# Patient Record
Sex: FEMALE | Race: ASIAN | NOT HISPANIC OR LATINO | Employment: PART TIME | URBAN - METROPOLITAN AREA
[De-identification: names, ages, dates, MRNs, and addresses within clinical notes are randomized per-mention and may not be internally consistent; named-entity substitution may affect disease eponyms.]

---

## 2021-11-15 ENCOUNTER — MEDICAL CORRESPONDENCE (OUTPATIENT)
Dept: HEALTH INFORMATION MANAGEMENT | Facility: CLINIC | Age: 21
End: 2021-11-15
Payer: COMMERCIAL

## 2021-11-19 ENCOUNTER — TRANSCRIBE ORDERS (OUTPATIENT)
Dept: OTHER | Age: 21
End: 2021-11-19
Payer: COMMERCIAL

## 2021-11-19 DIAGNOSIS — M79.642 PAIN OF LEFT HAND: Primary | ICD-10-CM

## 2021-11-19 DIAGNOSIS — G56.02 CARPAL TUNNEL SYNDROME OF LEFT WRIST: ICD-10-CM

## 2022-02-19 ENCOUNTER — HOSPITAL ENCOUNTER (EMERGENCY)
Facility: HOSPITAL | Age: 22
Discharge: HOME | End: 2022-02-19
Attending: EMERGENCY MEDICINE | Admitting: EMERGENCY MEDICINE
Payer: COMMERCIAL

## 2022-02-19 VITALS
DIASTOLIC BLOOD PRESSURE: 66 MMHG | HEART RATE: 87 BPM | RESPIRATION RATE: 12 BRPM | HEIGHT: 60 IN | OXYGEN SATURATION: 99 % | WEIGHT: 180 LBS | SYSTOLIC BLOOD PRESSURE: 146 MMHG | TEMPERATURE: 98.2 F | BODY MASS INDEX: 35.34 KG/M2

## 2022-02-19 DIAGNOSIS — G51.0 LEFT-SIDED BELL'S PALSY: ICD-10-CM

## 2022-02-19 PROCEDURE — 99284 EMERGENCY DEPT VISIT MOD MDM: CPT

## 2022-02-19 RX ORDER — VALACYCLOVIR HYDROCHLORIDE 1 G/1
1000 TABLET, FILM COATED ORAL 3 TIMES DAILY
Qty: 21 TABLET | Refills: 0 | Status: SHIPPED | OUTPATIENT
Start: 2022-02-19 | End: 2022-02-26

## 2022-02-19 RX ORDER — PREDNISONE 20 MG/1
TABLET ORAL
Qty: 21 TABLET | Refills: 0 | Status: SHIPPED | OUTPATIENT
Start: 2022-02-19

## 2022-02-19 RX ORDER — CARBOXYMETHYLCELLULOSE SODIUM 5 MG/ML
1 SOLUTION/ DROPS OPHTHALMIC
Qty: 30 ML | Refills: 0 | Status: SHIPPED | OUTPATIENT
Start: 2022-02-19

## 2022-02-19 ASSESSMENT — ENCOUNTER SYMPTOMS
VOMITING: 0
NECK PAIN: 0
NUMBNESS: 0
CONFUSION: 0
CHILLS: 0
SHORTNESS OF BREATH: 0
FACIAL ASYMMETRY: 1
COUGH: 0
DYSURIA: 0
WEAKNESS: 0
FATIGUE: 0
ABDOMINAL PAIN: 0
HEADACHES: 0
FEVER: 0

## 2022-02-19 NOTE — ED PROVIDER NOTES
EMERGENCY DEPARTMENT ENCOUNTER      NAME: Lori Carlson  AGE: 21 year old female  YOB: 2000  MRN: [id number]  EVALUATION DATE & TIME: 2/19/2022  1:41 PM    PCP: No primary care provider on file.    ED PROVIDER: Emerita Nur DO      Chief Complaint   Patient presents with     Facial Droop         FINAL IMPRESSION:  1. Left-sided Bell's palsy          ED COURSE & MEDICAL DECISION MAKING:    Pertinent Labs & Imaging studies reviewed. (See chart for details)  2:05 PM I met the patient and performed my initial interview and exam.    21 year old female presents to the Emergency Department for evaluation of left sided facial droop.    Patient presenting for evaluation of left-sided facial droop. She also difficulty eating on that side. Preceded with a viral syndrome with cough, runny nose and cold sore above her lip. Unable to fully shut her left eye. She wears glasses. No other focal deficits. Patient does have left upper and lower facial weakness consistent with Bell's palsy. Her TMs are clear. She does have herpetic lesion above her upper lip, but however does not involve the nose. No neck rigidity. I did discuss diagnosis and symptomatic care with patient. She will be placed on prednisone, valacyclovir and refresh eyedrops. Encourage close follow-up with her primary monico and return if any lateralizing symptoms.    At the conclusion of the encounter I discussed the results of all of the tests and the disposition. The questions were answered. The patient or family acknowledged understanding and was agreeable with the care plan.       MEDICATIONS GIVEN IN THE EMERGENCY:  Medications - No data to display    NEW PRESCRIPTIONS STARTED AT TODAY'S ER VISIT  New Prescriptions    ARTIFICIAL TEARS OINT OPHTHALMIC OINTMENT    Place 1 g Into the left eye At Bedtime    CARBOXYMETHYLCELLULOSE (REFRESH PLUS) 0.5 % SOLN OPHTHALMIC SOLUTION    Place 1 drop Into the left eye every hour as needed for dry eyes     "PREDNISONE (DELTASONE) 20 MG TABLET    Take three tablets (= 60mg) each day for 7 (seven) days    VALACYCLOVIR (VALTREX) 1000 MG TABLET    Take 1 tablet (1,000 mg) by mouth 3 times daily for 7 days          =================================================================    HPI    Patient information was obtained from: Patient    Use of : N/A        Lori Carlson is a 21 year old female with no pertinent history who presents to this ED via private car for evaluation of left sided facial droop.     Patient reports left sided facial droop and swelling with inability to completely close her left eye and raise her left eyebrow since Tuesday(2/15/22). She notes not being able to eat with left side of her mouth as she has lost almost all motor control of the left of her face. This has never happened before. Patient reports getting sick \"the week before Tuesday\" and getting better before her facial droop onset. She remarks left eye watering because she cannot close her eye lid all the way. Patient developed a cold sore to her upper lip at this time as well. No changes in hearing or vision. No recent travel. No allergies to medications. Denies fever, confusion, numbness in extremities, falls, neck pain, or any other symptoms at this time.       REVIEW OF SYSTEMS   Review of Systems   Constitutional: Negative for chills, fatigue and fever.   Eyes: Negative for visual disturbance.   Respiratory: Negative for cough and shortness of breath.    Cardiovascular: Negative for chest pain.   Gastrointestinal: Negative for abdominal pain and vomiting.   Genitourinary: Negative for dysuria.   Musculoskeletal: Negative for neck pain.   Skin: Negative.         Pos for cold sore to upper lip   Neurological: Positive for facial asymmetry (left sided facial droop(4 days)). Negative for syncope, weakness, numbness and headaches.   Psychiatric/Behavioral: Negative for confusion.   All other systems reviewed and are negative.   "     PAST MEDICAL HISTORY:  No past medical history on file.    PAST SURGICAL HISTORY:  No past surgical history on file.        CURRENT MEDICATIONS:    artificial tears OINT ophthalmic ointment  carboxymethylcellulose (REFRESH PLUS) 0.5 % SOLN ophthalmic solution  predniSONE (DELTASONE) 20 MG tablet  valACYclovir (VALTREX) 1000 mg tablet         ALLERGIES:  No Known Allergies    FAMILY HISTORY:  No family history on file.    SOCIAL HISTORY:   Social History     Socioeconomic History     Marital status: Not on file     Spouse name: Not on file     Number of children: Not on file     Years of education: Not on file     Highest education level: Not on file   Occupational History     Not on file   Tobacco Use     Smoking status: Not on file     Smokeless tobacco: Not on file   Substance and Sexual Activity     Alcohol use: Not on file     Drug use: Not on file     Sexual activity: Not on file   Other Topics Concern     Not on file   Social History Narrative     Not on file     Social Determinants of Health     Financial Resource Strain: Not on file   Food Insecurity: Not on file   Transportation Needs: Not on file   Physical Activity: Not on file   Stress: Not on file   Social Connections: Not on file   Intimate Partner Violence: Not on file   Housing Stability: Not on file       VITALS:  BP (!) 165/99   Pulse 96   Temp 98.2  F (36.8  C) (Tympanic)   Resp 12   Ht 1.524 m (5')   Wt 81.6 kg (180 lb)   SpO2 98%   BMI 35.15 kg/m      PHYSICAL EXAM    Physical Exam  Constitutional:       General: She is not in acute distress.  HENT:      Head: Normocephalic and atraumatic.      Right Ear: Tympanic membrane normal.      Left Ear: Tympanic membrane normal.      Nose: Nose normal.      Mouth/Throat:      Lips: Pink.      Mouth: Mucous membranes are moist. No oral lesions.      Pharynx: Oropharynx is clear.   Eyes:      General: No visual field deficit.     Pupils: Pupils are equal, round, and reactive to light.    Cardiovascular:      Rate and Rhythm: Normal rate and regular rhythm.      Pulses: Normal pulses.      Heart sounds: Normal heart sounds.   Pulmonary:      Effort: Pulmonary effort is normal.      Breath sounds: Normal breath sounds.   Abdominal:      General: Abdomen is flat. Bowel sounds are normal.      Palpations: Abdomen is soft.      Tenderness: There is no abdominal tenderness.   Musculoskeletal:         General: Normal range of motion.      Cervical back: No rigidity.   Skin:     General: Skin is warm and dry.      Capillary Refill: Capillary refill takes less than 2 seconds.      Findings: Lesion (herpetic lesions above upper lip) present.   Neurological:      General: No focal deficit present.      Mental Status: She is alert and oriented to person, place, and time.      Cranial Nerves: Facial asymmetry (Left sided upper and lower facial weakness) present. No dysarthria.      Sensory: Sensation is intact.      Motor: Motor function is intact. Weakness: left upper and lower face.      Coordination: Coordination is intact.      Gait: Gait is intact.      Comments: Inability to completely close left eye or raise left eyebrow       [place]ale, am serving as a scribe to document services personally performed by Dr. Emerita Nur based on my observation and the provider's statements to me. Emerita RUIZ DO attest that Dale Farr is acting in a scribe capacity, has observed my performance of the services and has documented them in accordance with my direction.    Emerita Nur DO  Emergency Medicine  [facility] EMERGENCY DEPARTMENT  [facility] [facility] [address]  [phone]  Dept: [phone]       Emerita Nur DO  02/19/22 1424

## 2022-02-19 NOTE — DISCHARGE INSTRUCTIONS
Start prednisone 60 mg daily for 1 week  Start valacyclovir, 1 tablet 3 times a day for 1 week  Lubricating eye solution to left eye every hour as needed during the day  Placed ointment to eye at bedtime, may cover with a eye shield  Follow closely with your doctor  Return if any high fevers, confusion, other neurologic symptoms or any other concerns

## 2022-02-19 NOTE — ED TRIAGE NOTES
Pt arrives with a 4 day hx (started on  Tuesday) of left facial  Droop and inability to close left eye. Her left eyed is watering as well. Pt did have viral sx 1 week before that with a cough,runny nose, and a cold sore under her nose,which is still present.  Pt also has had right and left jaw pain(right side worse than left) intermitently for the last week that is worse in the am.